# Patient Record
Sex: MALE | Race: BLACK OR AFRICAN AMERICAN
[De-identification: names, ages, dates, MRNs, and addresses within clinical notes are randomized per-mention and may not be internally consistent; named-entity substitution may affect disease eponyms.]

---

## 2017-03-27 RX ORDER — ALLOPURINOL 300 MG/1
TABLET ORAL
Qty: 30 TABLET | Refills: 3 | Status: SHIPPED | OUTPATIENT
Start: 2017-03-27

## 2017-10-06 NOTE — EMERGENCY ROOM REPORT
History of Present Illness


General


Chief Complaint:  Overdose


Source:  Patient, EMS





Present Illness


HPI


The patient presents with chest pressure and dysphoria after ingesting a 

marijuana edible.  He is a dialysis patient with peritoneal dialysis.  He 

denies any fevers.  He feels fatigued.  There is no headache.  He feels that he'

s having difficulty with his memory at the moment.  Family concerned as he 

removed his own PD catheter and appeared to do so clumsily.  He is anxious but 

denies SI.





Nausea.  No pain. No dyspnea.  Some feeling like heart racing.  No numbness, 

tingling or unilateral weakness.





Still makes urine.





No abdominal pain.


Allergies:  


Coded Allergies:  


     No Known Allergies (Unverified , 10/6/17)





Patient History


Past Medical History:  see triage record


Social History:  Denies: smoking


Social History Narrative


with family


Reviewed Nursing Documentation:  PMH: Agreed, PSxH: Agreed





Nursing Documentation-PMH


Hx Hypertension:  Yes


Hx Diabetes:  Yes - peritoneal dialysis


Hx Dialysis:  Yes





Review of Systems


All Other Systems:  negative except mentioned in HPI





Physical Exam





Vital Signs








  Date Time  Temp Pulse Resp B/P (MAP) Pulse Ox O2 Delivery O2 Flow Rate FiO2


 


10/6/17 02:53 98.2 106 18 136/79 98 Room Air  








Sp02 EP Interpretation:  reviewed, normal


General Appearance:  well appearing, no apparent distress, GCS 15


Head:  normocephalic


Eyes:  bilateral eye PERRL, bilateral eye Scleral Injection


ENT:  moist mucus membranes


Neck:  supple


Respiratory:  lungs clear, normal breath sounds


Cardiovascular #1:  tachycardia


Cardiovascular #2:  2+ radial (R)


Gastrointestinal:  normal inspection, normal bowel sounds, non tender, soft, no 

mass, non-distended, no guarding, no rebound, other - PD cath


Musculoskeletal:  back normal, gait/station normal, normal range of motion


Neurologic:  alert, oriented x3, CNs III-XII nml as tested, motor strength/tone 

normal, DTRs symmetric, sensory intact, speech normal


Psychiatric:  anxious


Skin:  normal inspection, warm/dry





Medical Decision Making


Diagnostic Impression:  


 Primary Impression:  


 Adverse reaction to cannabis


 Qualified Codes:  T40.7X5A - Adverse effect of cannabis (derivatives), initial 

encounter


 Additional Impression:  


 Renal failure treated with peritoneal dialysis


ER Course


Patient with dysphoria post ingestion of cannabis.  Complicated as he is PD - 

ESRD.  DDx: adverse reaction to THC, electrolyte abnormality, AMI amongst 

others.  Evaluation with EKG, labs.  As PD patient, not aggressive hydration.  

Monitor cor.  Ativan ordered.





Ativan held as patient sleeping.





Labs remarkable for CRF.  Urine not obtained.





Patient improved with observation.





Patient stable for outpatient observation and treatment.





Laboratory Tests








Test


  10/6/17


03:20


 


White Blood Count


  6.2 K/UL


(4.8-10.8)


 


Red Blood Count


  3.48 M/UL


(4.70-6.10)  L


 


Hemoglobin


  11.3 G/DL


(14.2-18.0)  L


 


Hematocrit


  35.7 %


(42.0-52.0)  L


 


Mean Corpuscular Volume


  103 FL (80-99)


H


 


Mean Corpuscular Hemoglobin


  32.5 PG


(27.0-31.0)  H


 


Mean Corpuscular Hemoglobin


Concent 31.6 G/DL


(32.0-36.0)  L


 


Red Cell Distribution Width


  14.6 %


(11.6-14.8)


 


Platelet Count


  283 K/UL


(150-450)


 


Mean Platelet Volume


  5.6 FL


(6.5-10.1)  L


 


Neutrophils (%) (Auto)


  68.5 %


(45.0-75.0)


 


Lymphocytes (%) (Auto)


  17.4 %


(20.0-45.0)  L


 


Monocytes (%) (Auto)


  8.4 %


(1.0-10.0)


 


Eosinophils (%) (Auto)


  4.1 %


(0.0-3.0)  H


 


Basophils (%) (Auto)


  1.6 %


(0.0-2.0)


 


Sodium Level


  139 mEQ/L


(135-145)


 


Potassium Level


  4.8 mEQ/L


(3.4-4.9)


 


Chloride Level


  97 mEQ/L


()  L


 


Carbon Dioxide Level


  25 mEQ/L


(20-30)


 


Anion Gap 17 (5-15)  H


 


Blood Urea Nitrogen


  67 mg/dL


(7-23)  H


 


Creatinine


  16.0 mg/dL


(0.7-1.2)  H


 


Estimate Glomerular


Filtration Rate 3.2 mL/min


(>60)


 


Glucose Level


  160 mg/dL


()  H


 


Calcium Level


  8.2 mg/dL


(8.6-10.2)  L


 


Total Bilirubin


  0.4 mg/dL


(0.0-1.2)


 


Aspartate Amino Transferase


(AST) 15 U/L (5-40)  


 


 


Alanine Aminotransferase (ALT) 12 U/L (3-41)  


 


Alkaline Phosphatase


  108 U/L


()


 


Total Creatine Kinase


  233 U/L


()  H


 


Troponin I


  < 0.30 ng/mL


(<=0.30)


 


Total Protein


  7.0 g/dL


(6.6-8.7)


 


Albumin


  3.8 g/dL


(3.5-5.2)


 


Globulin 3.2 g/dL  


 


Albumin/Globulin Ratio 1.1 (1.0-2.7)  


 


Salicylates Level


  < 1 mg/dL


(10-30)  L


 


Acetaminophen Level


  < 10 ug/mL


(10-30)  L


 


Serum Alcohol < 10 mg/dL  








EKG Diagnostic Results


Rate:  tachycardiac


Rhythm:  NSR


ST Segments:  no acute changes





Rhythm Strip Diag. Results


EP Interpretation:  yes


Rhythm:  no PVC's, no ectopy, other - ST





Chest X-Ray Diagnostic Results


Chest X-Ray Diagnostic Results :  


   Chest X-Ray Ordered:  Yes


   # of Views/Limited/Complete:  1 View


   Indication:  Other


   Interpretation:  no effusion, no pneumothorax, other - Pulmonary 

hypertension and cardiomegaly


   Impression:  Other


   Electronically Signed by:  Electronically signed by Luis Valencia MD





Last Vital Signs








  Date Time  Temp Pulse Resp B/P (MAP) Pulse Ox O2 Delivery O2 Flow Rate FiO2


 


10/6/17 07:41  98 16 138/80 96 Room Air  


 


10/6/17 03:30 97.0       








Status:  improved


Disposition:  HOME, SELF-CARE


Condition:  Improved











Luis Valencia M.D. Oct 6, 2017 03:15

## 2017-10-06 NOTE — DIAGNOSTIC IMAGING REPORT
Indication: Dyspnea



Comparison:  None



A single view chest radiograph was obtained.



Findings:



The heart is enlarged. Lungs are clear with normal vascularity. Bones are remarkable.



Impression:



No acute findings

## 2017-10-12 NOTE — CARDIOLOGY REPORT
--------------- APPROVED REPORT --------------





EKG Measurement

Heart [id number]

VA [id number]

[id number]

[id number]

[id number]





Sinus tachycardia

Septal infarct, age undetermined

Abnormal ECG

## 2018-10-10 ENCOUNTER — HOSPITAL ENCOUNTER (OUTPATIENT)
Dept: HOSPITAL 87 - RAD | Age: 53
Discharge: HOME | End: 2018-10-10
Attending: INTERNAL MEDICINE
Payer: MEDICARE

## 2018-10-10 DIAGNOSIS — I51.7: Primary | ICD-10-CM

## 2018-10-10 PROCEDURE — 71046 X-RAY EXAM CHEST 2 VIEWS: CPT

## 2019-07-19 ENCOUNTER — HOSPITAL ENCOUNTER (OUTPATIENT)
Dept: HOSPITAL 87 - RAD | Age: 54
Discharge: HOME | End: 2019-07-19
Attending: INTERNAL MEDICINE
Payer: MEDICARE
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
## 2019-07-19 DIAGNOSIS — I51.7: ICD-10-CM
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
## 2019-07-19 DIAGNOSIS — S22.32XA: Primary | ICD-10-CM
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
## 2019-07-19 PROCEDURE — 73060 X-RAY EXAM OF HUMERUS: CPT

## 2019-07-19 PROCEDURE — 71101 X-RAY EXAM UNILAT RIBS/CHEST: CPT

## 2019-07-19 PROCEDURE — 73090 X-RAY EXAM OF FOREARM: CPT

## 2020-08-06 ENCOUNTER — HOSPITAL ENCOUNTER (INPATIENT)
Dept: HOSPITAL 87 - ER | Age: 55
LOS: 4 days | Discharge: HOME | DRG: 371 | End: 2020-08-10
Attending: FAMILY MEDICINE | Admitting: FAMILY MEDICINE
Payer: MEDICAID

## 2020-08-06 ENCOUNTER — HOSPITAL ENCOUNTER (EMERGENCY)
Dept: HOSPITAL 72 - EMR | Age: 55
Discharge: LEFT BEFORE DISCHARGE | End: 2020-08-06
Payer: MEDICARE

## 2020-08-06 VITALS — WEIGHT: 220 LBS | HEIGHT: 70 IN | BODY MASS INDEX: 31.5 KG/M2

## 2020-08-06 VITALS — SYSTOLIC BLOOD PRESSURE: 140 MMHG | DIASTOLIC BLOOD PRESSURE: 68 MMHG

## 2020-08-06 VITALS — DIASTOLIC BLOOD PRESSURE: 75 MMHG | SYSTOLIC BLOOD PRESSURE: 138 MMHG

## 2020-08-06 VITALS — DIASTOLIC BLOOD PRESSURE: 72 MMHG | SYSTOLIC BLOOD PRESSURE: 139 MMHG

## 2020-08-06 VITALS — HEIGHT: 69 IN | WEIGHT: 212.44 LBS | BODY MASS INDEX: 31.46 KG/M2
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
## 2020-08-06 DIAGNOSIS — D63.1: ICD-10-CM

## 2020-08-06 DIAGNOSIS — F19.10: ICD-10-CM

## 2020-08-06 DIAGNOSIS — I31.3: ICD-10-CM

## 2020-08-06 DIAGNOSIS — Z99.2: ICD-10-CM

## 2020-08-06 DIAGNOSIS — M10.9: ICD-10-CM

## 2020-08-06 DIAGNOSIS — E86.0: ICD-10-CM

## 2020-08-06 DIAGNOSIS — Z91.19: ICD-10-CM

## 2020-08-06 DIAGNOSIS — K65.9: Primary | ICD-10-CM

## 2020-08-06 DIAGNOSIS — Z82.49: ICD-10-CM

## 2020-08-06 DIAGNOSIS — Z79.899: ICD-10-CM

## 2020-08-06 DIAGNOSIS — E87.6: ICD-10-CM

## 2020-08-06 DIAGNOSIS — N18.6: ICD-10-CM

## 2020-08-06 DIAGNOSIS — I12.0: ICD-10-CM

## 2020-08-06 DIAGNOSIS — G47.00: ICD-10-CM

## 2020-08-06 DIAGNOSIS — R07.89: ICD-10-CM

## 2020-08-06 DIAGNOSIS — F41.9: ICD-10-CM

## 2020-08-06 DIAGNOSIS — T40.7X1A: Primary | ICD-10-CM

## 2020-08-06 DIAGNOSIS — E46: ICD-10-CM

## 2020-08-06 DIAGNOSIS — E87.1: ICD-10-CM

## 2020-08-06 DIAGNOSIS — N19: ICD-10-CM

## 2020-08-06 LAB
ADD MANUAL DIFF: NO
ALBUMIN SERPL-MCNC: 3.3 G/DL (ref 3.4–5)
ALBUMIN/GLOB SERPL: 0.6 {RATIO} (ref 1–2.7)
ALP SERPL-CCNC: 94 U/L (ref 46–116)
ALT SERPL-CCNC: 20 U/L (ref 12–78)
AMMONIA PLAS-SCNC: 10 UMOL/L (ref 11–32)
ANION GAP SERPL CALC-SCNC: 12 MMOL/L (ref 5–15)
AST SERPL-CCNC: 18 U/L (ref 15–37)
BASOPHILS NFR BLD AUTO: 1 % (ref 0–2)
BASOPHILS NFR BLD AUTO: 1.6 % (ref 0–2)
BILIRUB SERPL-MCNC: 0.5 MG/DL (ref 0.2–1)
BUN SERPL-MCNC: 56 MG/DL (ref 7–18)
CALCIUM SERPL-MCNC: 9.9 MG/DL (ref 8.5–10.1)
CHLORIDE SERPL-SCNC: 94 MMOL/L (ref 98–107)
CHLORIDE SERPL-SCNC: 96 MEQ/L (ref 98–107)
CO2 SERPL-SCNC: 28 MMOL/L (ref 21–32)
CREAT SERPL-MCNC: 16.9 MG/DL (ref 0.55–1.3)
EOSINOPHIL NFR BLD AUTO: 1.5 % (ref 0–5)
EOSINOPHIL NFR BLD AUTO: 2.8 % (ref 0–3)
ERYTHROCYTE [DISTWIDTH] IN BLOOD BY AUTOMATED COUNT: 15 % (ref 11.6–14.8)
ERYTHROCYTE [DISTWIDTH] IN BLOOD BY AUTOMATED COUNT: 17.1 % (ref 11.6–14.6)
GLOBULIN SER-MCNC: 5.3 G/DL
HCT VFR BLD AUTO: 28 % (ref 42–52)
HCT VFR BLD CALC: 33.7 % (ref 42–52)
HGB BLD-MCNC: 10.9 G/DL (ref 14.2–18)
HGB BLD-MCNC: 9.5 G/DL (ref 14–18)
INR PPP: 1
LYMPHOCYTES NFR BLD AUTO: 21.4 % (ref 20–45)
LYMPHOCYTES NFR BLD AUTO: 21.4 % (ref 20–50)
MCH RBC QN AUTO: 31.7 PG (ref 28–32)
MCV RBC AUTO: 93.6 FL (ref 80–94)
MCV RBC AUTO: 96 FL (ref 80–99)
MONOCYTES NFR BLD AUTO: 8.5 % (ref 2–8)
MONOCYTES NFR BLD AUTO: 9 % (ref 1–10)
NEUTROPHILS NFR BLD AUTO: 65.2 % (ref 45–75)
NEUTROPHILS NFR BLD AUTO: 67.6 % (ref 40–76)
PLATELET # BLD AUTO: 305 X1000/UL (ref 130–400)
PLATELET # BLD: 350 K/UL (ref 150–450)
PMV BLD AUTO: 7.9 FL (ref 7.4–10.4)
POTASSIUM SERPL-SCNC: 3.4 MMOL/L (ref 3.5–5.1)
PROTHROMBIN TIME: 10.7 SEC (ref 9.6–11)
RBC # BLD AUTO: 2.99 MILL/UL (ref 4.7–6.1)
RBC # BLD AUTO: 3.51 M/UL (ref 4.7–6.1)
SODIUM SERPL-SCNC: 134 MMOL/L (ref 136–145)
WBC # BLD AUTO: 6.5 K/UL (ref 4.8–10.8)

## 2020-08-06 PROCEDURE — 83550 IRON BINDING TEST: CPT

## 2020-08-06 PROCEDURE — 36415 COLL VENOUS BLD VENIPUNCTURE: CPT

## 2020-08-06 PROCEDURE — 80061 LIPID PANEL: CPT

## 2020-08-06 PROCEDURE — 93970 EXTREMITY STUDY: CPT

## 2020-08-06 PROCEDURE — 85025 COMPLETE CBC W/AUTO DIFF WBC: CPT

## 2020-08-06 PROCEDURE — 93306 TTE W/DOPPLER COMPLETE: CPT

## 2020-08-06 PROCEDURE — 82550 ASSAY OF CK (CPK): CPT

## 2020-08-06 PROCEDURE — 84100 ASSAY OF PHOSPHORUS: CPT

## 2020-08-06 PROCEDURE — 80053 COMPREHEN METABOLIC PANEL: CPT

## 2020-08-06 PROCEDURE — 84484 ASSAY OF TROPONIN QUANT: CPT

## 2020-08-06 PROCEDURE — 71045 X-RAY EXAM CHEST 1 VIEW: CPT

## 2020-08-06 PROCEDURE — 70450 CT HEAD/BRAIN W/O DYE: CPT

## 2020-08-06 PROCEDURE — 82140 ASSAY OF AMMONIA: CPT

## 2020-08-06 PROCEDURE — 93005 ELECTROCARDIOGRAM TRACING: CPT

## 2020-08-06 PROCEDURE — 83605 ASSAY OF LACTIC ACID: CPT

## 2020-08-06 PROCEDURE — 83735 ASSAY OF MAGNESIUM: CPT

## 2020-08-06 PROCEDURE — 83540 ASSAY OF IRON: CPT

## 2020-08-06 PROCEDURE — 83880 ASSAY OF NATRIURETIC PEPTIDE: CPT

## 2020-08-06 PROCEDURE — 80305 DRUG TEST PRSMV DIR OPT OBS: CPT

## 2020-08-06 PROCEDURE — 80048 BASIC METABOLIC PNL TOTAL CA: CPT

## 2020-08-06 PROCEDURE — 82553 CREATINE MB FRACTION: CPT

## 2020-08-06 PROCEDURE — 99284 EMERGENCY DEPT VISIT MOD MDM: CPT

## 2020-08-06 PROCEDURE — 96365 THER/PROPH/DIAG IV INF INIT: CPT

## 2020-08-06 PROCEDURE — 99285 EMERGENCY DEPT VISIT HI MDM: CPT

## 2020-08-06 RX ADMIN — ENOXAPARIN SODIUM SCH MG: 30 INJECTION SUBCUTANEOUS at 23:13

## 2020-08-06 NOTE — NUR
ED Nurse Note:



Patient BIBA from home after wife called 911 d/t patient acting abnormal. 
Patient states he normally eats one 5 mg THC gummy to assist with sleep. 
Patient ate 3 gummies at one time last night, total of 15 mg THC. Patient 
states he feels that he took too much, feels "spaced out". Patient AxO x 4, 
able to communicate clearly and express needs. Pupils PERLLA. Breathing even 
and unlabored. Patient on the cardiac monitor, VSS. Will continue to monitor.

## 2020-08-06 NOTE — NUR
ED Nurse Note:



Patient resting in bed, no s/s of acute distress. Breathing even and unlabored, 
patient states he is feeling better than when he came in.

## 2020-08-06 NOTE — NUR
ED Nurse Note:



20 g IV started in right forearm by EMS prior to arrival. Blood drawn and sent 
to lab.

## 2020-08-06 NOTE — DIAGNOSTIC IMAGING REPORT
Indication: Reason For Exam: AMS

 

Technique: Continuous helical CT scanning of the head was performed without

intravenous contrast material. Axial and coronal 5 mm sections were generated.

 

Dose:

Total Dose Length Product - DLP 2404 mGycm.

Volume CT Dose Index - CTDIvol(s) 160.20 mGy.

Automated exposure control was utilized for dose reduction.

 

Comparison:None.

 

Findings: The ventricular system is normal in size and configuration. There is no

shift of midline structures. No abnormal extra-axial fluid collections are noted.

There is no evidence of intracerebral bleeding. No other abnormal high or low density

areas are noted within the brain.

 

Impression:

 

Normal CT scan of the head without contrast material.

 

 

 

The CT scanner at [facility] is accredited by the [employer] College of

Radiology and the scans are performed using protocols designed to limit radiation

exposure to as low as reasonably achievable to attain images of sufficient resolution

adequate for diagnostic evaluation.

## 2020-08-06 NOTE — EMERGENCY ROOM REPORT
History of Present Illness


General


Chief Complaint:  Overdose


Source:  Patient, EMS





Present Illness


HPI


Patient is a 55-year-old male brought in by ambulance after increased altered 

mental status.  Prior history of end-stage renal disease on peritoneal 

dialysis.  Was noted to be increasing confusion.  Reportedly had been ingesting 

marijuana edibles.  Patient had been noted to have a blood sugar of 200 with 

EMS.  Reportedly had been moving all extremities as well as having increased 

bizarre behavior.  History is limited by patient's mental status


Allergies:  


Coded Allergies:  


     No Known Allergies (Unverified , 10/6/17)





COVID-19 Screening


Contact w/high risk pt:  No


Experienced COVID-19 symptoms?:  No


COVID-19 Testing performed PTA:  No





Patient History


Past Medical History:  see triage record


Reviewed Nursing Documentation:  PMH: Agreed; PSxH: Agreed





Review of Systems


All Other Systems:  limited - Limited by poor historian





Physical Exam





Vital Signs








  Date Time  Temp Pulse Resp B/P (MAP) Pulse Ox O2 Delivery O2 Flow Rate FiO2


 


8/6/20 07:33 97.5 90 16 113/67 (82) 98 Room Air  








Sp02 EP Interpretation:  reviewed, normal


General Appearance:  normal inspection, well appearing, no apparent distress, 

alert, Chronically Ill


Head:  atraumatic


ENT:  normal ENT inspection, hearing grossly normal, normal voice


Neck:  normal inspection, full range of motion, supple, no bony tend


Respiratory:  normal inspection, lungs clear, normal breath sounds, no 

respiratory distress, no retraction, no wheezing


Cardiovascular #1:  regular rate, rhythm, no edema


Gastrointestinal:  normal inspection, normal bowel sounds, non tender, soft, no 

guarding, no hernia, other - Peritoneal dialysis catheter in place


Genitourinary:  no CVA tenderness


Musculoskeletal:  normal inspection, back normal, normal range of motion


Neurologic:  alert, motor strength/tone normal, oriented x3, responsive, speech 

normal, other - Confused


Psychiatric:  normal inspection, judgement/insight normal, mood/affect normal


Skin:  no rash





Medical Decision Making


Diagnostic Impression:  


 Primary Impression:  


 Drug overdose


 Additional Impressions:  


 Renal failure treated with peritoneal dialysis


 Adverse reaction to cannabis


ER Course


Patient presented for altered mental status.  Differential diagnosis include 

was not limited to uremia, hypoglycemia, drug overdose, electrolyte abnormality 

among others.  Because of complexity of patient's case laboratory tests and 

imaging studies were ordered.  Patient was noted to have some prior history of 

peritoneal dialysis and has a benign abdominal exam.  Patient's blood sugar was 

checked by EMS and was 200.  CT imaging was ordered due to patient's altered 

mental status.Patient was observed in the emergency department and had 

improvement in his mental status over time.  CT imaging showed no evidence of 

acute pathology.  Patient had been noted to be awake alert and oriented x3.  

Patient was offered admission due to abnormal  on EKG. Patient initially 

refused.





Labs








Test


  8/6/20


07:50


 


White Blood Count


  6.5 K/UL


(4.8-10.8)


 


Red Blood Count


  3.51 M/UL


(4.70-6.10)


 


Hemoglobin


  10.9 G/DL


(14.2-18.0)


 


Hematocrit


  33.7 %


(42.0-52.0)


 


Mean Corpuscular Volume 96 FL (80-99) 


 


Mean Corpuscular Hemoglobin


  30.9 PG


(27.0-31.0)


 


Mean Corpuscular Hemoglobin


Concent 32.2 G/DL


(32.0-36.0)


 


Red Cell Distribution Width


  15.0 %


(11.6-14.8)


 


Platelet Count


  350 K/UL


(150-450)


 


Mean Platelet Volume


  5.9 FL


(6.5-10.1)


 


Neutrophils (%) (Auto)


  65.2 %


(45.0-75.0)


 


Lymphocytes (%) (Auto)


  21.4 %


(20.0-45.0)


 


Monocytes (%) (Auto)


  9.0 %


(1.0-10.0)


 


Eosinophils (%) (Auto)


  2.8 %


(0.0-3.0)


 


Basophils (%) (Auto)


  1.6 %


(0.0-2.0)


 


Sodium Level


  134 MMOL/L


(136-145)


 


Potassium Level


  3.4 MMOL/L


(3.5-5.1)


 


Chloride Level


  94 MMOL/L


()


 


Carbon Dioxide Level


  28 MMOL/L


(21-32)


 


Anion Gap


  12 mmol/L


(5-15)


 


Blood Urea Nitrogen


  56 mg/dL


(7-18)


 


Creatinine


  16.9 MG/DL


(0.55-1.30)


 


Estimat Glomerular Filtration


Rate 3.6 mL/min


(>60)


 


Glucose Level


  171 MG/DL


()


 


Lactic Acid Level


  1.40 mmol/L


(0.4-2.0)


 


Calcium Level


  9.9 MG/DL


(8.5-10.1)


 


Total Bilirubin


  0.5 MG/DL


(0.2-1.0)


 


Aspartate Amino Transf


(AST/SGOT) 18 U/L (15-37) 


 


 


Alanine Aminotransferase


(ALT/SGPT) 20 U/L (12-78) 


 


 


Alkaline Phosphatase


  94 U/L


()


 


Ammonia


  10 umol/L


(11-32)


 


Troponin I


  0.012 ng/mL


(0.000-0.056)


 


Total Protein


  8.6 G/DL


(6.4-8.2)


 


Albumin


  3.3 G/DL


(3.4-5.0)


 


Globulin 5.3 g/dL 


 


Albumin/Globulin Ratio 0.6 (1.0-2.7) 


 


Salicylates Level


  0.8 ug/mL


(2.8-20)


 


Acetaminophen Level


  < 2 MCG/ML


(10-30)


 


Serum Alcohol < 3 mg/dL 








EKG Diagnostic Results


Rate:  normal


Rhythm:  NSR


ST Segments:  other - T wave inversion.





Last Vital Signs








  Date Time  Temp Pulse Resp B/P (MAP) Pulse Ox O2 Delivery O2 Flow Rate FiO2


 


8/6/20 07:33 97.5 90 16 113/67 (82) 98 Room Air  








Status:  unchanged


Disposition:  SHORT-TERM HOSP


Condition:  Stable











Shan Courtney MD Aug 6, 2020 07:45

## 2020-08-06 NOTE — NUR
ED Nurse Note:



Patient attempted to provide urine x 2, unable to urinate. Urinal left at 
bedside with patient.

## 2020-08-07 VITALS — DIASTOLIC BLOOD PRESSURE: 93 MMHG | SYSTOLIC BLOOD PRESSURE: 153 MMHG

## 2020-08-07 VITALS — SYSTOLIC BLOOD PRESSURE: 160 MMHG | DIASTOLIC BLOOD PRESSURE: 100 MMHG

## 2020-08-07 VITALS — DIASTOLIC BLOOD PRESSURE: 90 MMHG | SYSTOLIC BLOOD PRESSURE: 154 MMHG

## 2020-08-07 LAB
BASOPHILS NFR BLD AUTO: 1.5 % (ref 0–2)
CHLORIDE SERPL-SCNC: 98 MEQ/L (ref 98–107)
CK MB SERPL-CCNC: 1.2 NG/ML (ref 0.5–3.6)
CK MB SERPL-CCNC: 1.7 NG/ML (ref 0.5–3.6)
CK SERPL-CCNC: 160 IU/L (ref 39–308)
CK SERPL-CCNC: 161 IU/L (ref 39–308)
EOSINOPHIL NFR BLD AUTO: 2.1 % (ref 0–5)
ERYTHROCYTE [DISTWIDTH] IN BLOOD BY AUTOMATED COUNT: 17.3 % (ref 11.6–14.6)
HCT VFR BLD AUTO: 25.4 % (ref 42–52)
HDLC SERPL-MCNC: 26 MG/DL (ref 40–59)
HGB BLD-MCNC: 8.6 G/DL (ref 14–18)
LDLC SERPL DIRECT ASSAY-MCNC: 71 MG/DL (ref 5–100)
LYMPHOCYTES NFR BLD AUTO: 21.4 % (ref 20–50)
MCH RBC QN AUTO: 31.4 PG (ref 28–32)
MCV RBC AUTO: 92.9 FL (ref 80–94)
MONOCYTES NFR BLD AUTO: 7.5 % (ref 2–8)
NEUTROPHILS NFR BLD AUTO: 67.5 % (ref 40–76)
PHOSPHATE SERPL-MCNC: 4.4 MG/DL (ref 2.5–4.9)
PLATELET # BLD AUTO: 267 X1000/UL (ref 130–400)
PMV BLD AUTO: 7.5 FL (ref 7.4–10.4)
RBC # BLD AUTO: 2.74 MILL/UL (ref 4.7–6.1)
TIBC SERPL-MCNC: 156 UG/DL (ref 250–450)

## 2020-08-07 RX ADMIN — ENOXAPARIN SODIUM SCH MG: 30 INJECTION SUBCUTANEOUS at 21:49

## 2020-08-07 RX ADMIN — CINACALCET HYDROCHLORIDE SCH MG: 30 TABLET, COATED ORAL at 09:42

## 2020-08-07 RX ADMIN — NIFEDIPINE SCH MG: 30 TABLET, FILM COATED, EXTENDED RELEASE ORAL at 09:46

## 2020-08-07 RX ADMIN — SERTRALINE HYDROCHLORIDE SCH MG: 50 TABLET, FILM COATED ORAL at 09:42

## 2020-08-07 RX ADMIN — OMEPRAZOLE SCH MG: 20 CAPSULE, DELAYED RELEASE ORAL at 09:41

## 2020-08-07 RX ADMIN — ATORVASTATIN CALCIUM SCH MG: 10 TABLET, FILM COATED ORAL at 21:50

## 2020-08-07 RX ADMIN — CARVEDILOL SCH MG: 12.5 TABLET, FILM COATED ORAL at 21:50

## 2020-08-07 RX ADMIN — ALLOPURINOL SCH MG: 100 TABLET ORAL at 09:41

## 2020-08-07 RX ADMIN — CARVEDILOL SCH MG: 12.5 TABLET, FILM COATED ORAL at 09:47

## 2020-08-08 VITALS — SYSTOLIC BLOOD PRESSURE: 150 MMHG | DIASTOLIC BLOOD PRESSURE: 92 MMHG

## 2020-08-08 VITALS — DIASTOLIC BLOOD PRESSURE: 81 MMHG | SYSTOLIC BLOOD PRESSURE: 153 MMHG

## 2020-08-08 VITALS — SYSTOLIC BLOOD PRESSURE: 136 MMHG | DIASTOLIC BLOOD PRESSURE: 78 MMHG

## 2020-08-08 VITALS — SYSTOLIC BLOOD PRESSURE: 126 MMHG | DIASTOLIC BLOOD PRESSURE: 73 MMHG

## 2020-08-08 VITALS — SYSTOLIC BLOOD PRESSURE: 123 MMHG | DIASTOLIC BLOOD PRESSURE: 81 MMHG

## 2020-08-08 VITALS — SYSTOLIC BLOOD PRESSURE: 149 MMHG | DIASTOLIC BLOOD PRESSURE: 85 MMHG

## 2020-08-08 LAB
AMPHETAMINES UR QL SCN: NEGATIVE
BARBITURATES UR QL SCN: NEGATIVE
BASOPHILS NFR BLD AUTO: 0.9 % (ref 0–2)
BENZODIAZ UR QL SCN: NEGATIVE
BZE UR QL SCN: NEGATIVE
CANNABINOIDS UR QL SCN: NEGATIVE
CHLORIDE SERPL-SCNC: 95 MEQ/L (ref 98–107)
EOSINOPHIL NFR BLD AUTO: 3.2 % (ref 0–5)
ERYTHROCYTE [DISTWIDTH] IN BLOOD BY AUTOMATED COUNT: 17.5 % (ref 11.6–14.6)
HCT VFR BLD AUTO: 25.5 % (ref 42–52)
HGB BLD-MCNC: 8.6 G/DL (ref 14–18)
LYMPHOCYTES NFR BLD AUTO: 20.5 % (ref 20–50)
MCH RBC QN AUTO: 31.6 PG (ref 28–32)
MCV RBC AUTO: 93.6 FL (ref 80–94)
METHADONE UR QL SCN: NEGATIVE
MONOCYTES NFR BLD AUTO: 9.9 % (ref 2–8)
NEUTROPHILS NFR BLD AUTO: 65.5 % (ref 40–76)
OPIATES UR QL SCN: NEGATIVE
PCP UR QL SCN: NEGATIVE
PLATELET # BLD AUTO: 258 X1000/UL (ref 130–400)
PMV BLD AUTO: 7.9 FL (ref 7.4–10.4)
RBC # BLD AUTO: 2.73 MILL/UL (ref 4.7–6.1)

## 2020-08-08 RX ADMIN — OMEPRAZOLE SCH MG: 20 CAPSULE, DELAYED RELEASE ORAL at 06:35

## 2020-08-08 RX ADMIN — ENOXAPARIN SODIUM SCH MG: 30 INJECTION SUBCUTANEOUS at 21:05

## 2020-08-08 RX ADMIN — Medication PRN MG: at 00:42

## 2020-08-08 RX ADMIN — CARVEDILOL SCH MG: 12.5 TABLET, FILM COATED ORAL at 08:49

## 2020-08-08 RX ADMIN — CINACALCET HYDROCHLORIDE SCH MG: 30 TABLET, COATED ORAL at 08:50

## 2020-08-08 RX ADMIN — ALLOPURINOL SCH MG: 100 TABLET ORAL at 08:50

## 2020-08-08 RX ADMIN — ATORVASTATIN CALCIUM SCH MG: 10 TABLET, FILM COATED ORAL at 21:05

## 2020-08-08 RX ADMIN — SERTRALINE HYDROCHLORIDE SCH MG: 50 TABLET, FILM COATED ORAL at 08:50

## 2020-08-08 RX ADMIN — NIFEDIPINE SCH MG: 30 TABLET, FILM COATED, EXTENDED RELEASE ORAL at 08:50

## 2020-08-08 RX ADMIN — Medication PRN MG: at 22:45

## 2020-08-08 RX ADMIN — Medication PRN MG: at 12:42

## 2020-08-08 RX ADMIN — CARVEDILOL SCH MG: 12.5 TABLET, FILM COATED ORAL at 21:05

## 2020-08-09 VITALS — DIASTOLIC BLOOD PRESSURE: 90 MMHG | SYSTOLIC BLOOD PRESSURE: 140 MMHG

## 2020-08-09 VITALS — SYSTOLIC BLOOD PRESSURE: 144 MMHG | DIASTOLIC BLOOD PRESSURE: 80 MMHG

## 2020-08-09 VITALS — DIASTOLIC BLOOD PRESSURE: 92 MMHG | SYSTOLIC BLOOD PRESSURE: 142 MMHG

## 2020-08-09 VITALS — SYSTOLIC BLOOD PRESSURE: 139 MMHG | DIASTOLIC BLOOD PRESSURE: 79 MMHG

## 2020-08-09 VITALS — SYSTOLIC BLOOD PRESSURE: 138 MMHG | DIASTOLIC BLOOD PRESSURE: 79 MMHG

## 2020-08-09 VITALS — DIASTOLIC BLOOD PRESSURE: 82 MMHG | SYSTOLIC BLOOD PRESSURE: 159 MMHG

## 2020-08-09 LAB
BASOPHILS NFR BLD AUTO: 0.8 % (ref 0–2)
CHLORIDE SERPL-SCNC: 95 MEQ/L (ref 98–107)
EOSINOPHIL NFR BLD AUTO: 2.2 % (ref 0–5)
ERYTHROCYTE [DISTWIDTH] IN BLOOD BY AUTOMATED COUNT: 16.9 % (ref 11.6–14.6)
HCT VFR BLD AUTO: 25.6 % (ref 42–52)
HGB BLD-MCNC: 8.6 G/DL (ref 14–18)
LYMPHOCYTES NFR BLD AUTO: 18.2 % (ref 20–50)
MCH RBC QN AUTO: 31.3 PG (ref 28–32)
MCV RBC AUTO: 93.7 FL (ref 80–94)
MONOCYTES NFR BLD AUTO: 10.6 % (ref 2–8)
NEUTROPHILS NFR BLD AUTO: 68.2 % (ref 40–76)
PLATELET # BLD AUTO: 277 X1000/UL (ref 130–400)
PMV BLD AUTO: 7.7 FL (ref 7.4–10.4)
RBC # BLD AUTO: 2.73 MILL/UL (ref 4.7–6.1)

## 2020-08-09 PROCEDURE — 3E1M39Z IRRIGATION OF PERITONEAL CAVITY USING DIALYSATE, PERCUTANEOUS APPROACH: ICD-10-PCS | Performed by: FAMILY MEDICINE

## 2020-08-09 RX ADMIN — CARVEDILOL SCH MG: 12.5 TABLET, FILM COATED ORAL at 09:00

## 2020-08-09 RX ADMIN — ALLOPURINOL SCH MG: 100 TABLET ORAL at 09:00

## 2020-08-09 RX ADMIN — ALLOPURINOL SCH MG: 100 TABLET ORAL at 11:42

## 2020-08-09 RX ADMIN — SERTRALINE HYDROCHLORIDE SCH MG: 50 TABLET, FILM COATED ORAL at 09:00

## 2020-08-09 RX ADMIN — CINACALCET HYDROCHLORIDE SCH MG: 30 TABLET, COATED ORAL at 11:42

## 2020-08-09 RX ADMIN — Medication PRN MG: at 20:59

## 2020-08-09 RX ADMIN — ENOXAPARIN SODIUM SCH MG: 30 INJECTION SUBCUTANEOUS at 21:04

## 2020-08-09 RX ADMIN — NIFEDIPINE SCH MG: 30 TABLET, FILM COATED, EXTENDED RELEASE ORAL at 09:00

## 2020-08-09 RX ADMIN — SERTRALINE HYDROCHLORIDE SCH MG: 50 TABLET, FILM COATED ORAL at 11:44

## 2020-08-09 RX ADMIN — CARVEDILOL SCH MG: 12.5 TABLET, FILM COATED ORAL at 11:42

## 2020-08-09 RX ADMIN — CINACALCET HYDROCHLORIDE SCH MG: 30 TABLET, COATED ORAL at 09:00

## 2020-08-09 RX ADMIN — CARVEDILOL SCH MG: 12.5 TABLET, FILM COATED ORAL at 20:59

## 2020-08-09 RX ADMIN — ATORVASTATIN CALCIUM SCH MG: 10 TABLET, FILM COATED ORAL at 20:59

## 2020-08-09 RX ADMIN — Medication PRN MG: at 15:55

## 2020-08-09 RX ADMIN — FAMOTIDINE SCH MG: 20 TABLET ORAL at 09:00

## 2020-08-09 RX ADMIN — NIFEDIPINE SCH MG: 30 TABLET, FILM COATED, EXTENDED RELEASE ORAL at 11:44

## 2020-08-09 RX ADMIN — FAMOTIDINE SCH MG: 20 TABLET ORAL at 11:43

## 2020-08-10 VITALS — DIASTOLIC BLOOD PRESSURE: 81 MMHG | SYSTOLIC BLOOD PRESSURE: 139 MMHG

## 2020-08-10 VITALS — SYSTOLIC BLOOD PRESSURE: 133 MMHG | DIASTOLIC BLOOD PRESSURE: 77 MMHG

## 2020-08-10 VITALS — DIASTOLIC BLOOD PRESSURE: 70 MMHG | SYSTOLIC BLOOD PRESSURE: 135 MMHG

## 2020-08-10 VITALS — DIASTOLIC BLOOD PRESSURE: 80 MMHG | SYSTOLIC BLOOD PRESSURE: 146 MMHG

## 2020-08-10 LAB
BASOPHILS NFR BLD AUTO: 0.7 % (ref 0–2)
CHLORIDE SERPL-SCNC: 96 MEQ/L (ref 98–107)
EOSINOPHIL NFR BLD AUTO: 2.1 % (ref 0–5)
ERYTHROCYTE [DISTWIDTH] IN BLOOD BY AUTOMATED COUNT: 17.2 % (ref 11.6–14.6)
HCT VFR BLD AUTO: 24.9 % (ref 42–52)
HGB BLD-MCNC: 8.5 G/DL (ref 14–18)
LYMPHOCYTES NFR BLD AUTO: 19.5 % (ref 20–50)
MCH RBC QN AUTO: 32 PG (ref 28–32)
MCV RBC AUTO: 93.5 FL (ref 80–94)
MONOCYTES NFR BLD AUTO: 9.4 % (ref 2–8)
NEUTROPHILS NFR BLD AUTO: 68.3 % (ref 40–76)
PLATELET # BLD AUTO: 262 X1000/UL (ref 130–400)
PMV BLD AUTO: 7.5 FL (ref 7.4–10.4)
RBC # BLD AUTO: 2.66 MILL/UL (ref 4.7–6.1)

## 2020-08-10 RX ADMIN — SERTRALINE HYDROCHLORIDE SCH MG: 50 TABLET, FILM COATED ORAL at 09:11

## 2020-08-10 RX ADMIN — NIFEDIPINE SCH MG: 30 TABLET, FILM COATED, EXTENDED RELEASE ORAL at 09:11

## 2020-08-10 RX ADMIN — CINACALCET HYDROCHLORIDE SCH MG: 30 TABLET, COATED ORAL at 09:11

## 2020-08-10 RX ADMIN — FAMOTIDINE SCH MG: 20 TABLET ORAL at 09:11

## 2020-08-10 RX ADMIN — ALLOPURINOL SCH MG: 100 TABLET ORAL at 09:11

## 2020-08-10 RX ADMIN — CARVEDILOL SCH MG: 12.5 TABLET, FILM COATED ORAL at 09:11

## 2025-01-18 ENCOUNTER — HOSPITAL ENCOUNTER (EMERGENCY)
Dept: HOSPITAL 87 - ER | Age: 60
Discharge: LEFT BEFORE DISCHARGE | End: 2025-01-18
Payer: MEDICARE

## 2025-01-18 VITALS
HEART RATE: 74 BPM | TEMPERATURE: 99.5 F | RESPIRATION RATE: 18 BRPM | DIASTOLIC BLOOD PRESSURE: 75 MMHG | SYSTOLIC BLOOD PRESSURE: 121 MMHG | OXYGEN SATURATION: 100 %

## 2025-01-18 VITALS — OXYGEN SATURATION: 99 %

## 2025-01-18 VITALS — HEIGHT: 69 IN | BODY MASS INDEX: 29.32 KG/M2 | WEIGHT: 197.98 LBS

## 2025-01-18 DIAGNOSIS — T80.211A: Primary | ICD-10-CM
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
## 2025-01-18 DIAGNOSIS — N28.9: ICD-10-CM

## 2025-01-18 DIAGNOSIS — Z53.21: ICD-10-CM

## 2025-01-18 DIAGNOSIS — Z99.2: ICD-10-CM

## 2025-01-18 DIAGNOSIS — Z98.890: ICD-10-CM

## 2025-01-18 LAB
BASOPHILS NFR BLD AUTO: 1 % (ref 0–2)
BUN SERPL-MCNC: 16 MG/DL (ref 9–23)
CALCIUM SERPL-MCNC: 10.1 MG/DL (ref 8.7–10.4)
CHLORIDE SERPL-SCNC: 96 MEQ/L (ref 98–107)
CO2 SERPL-SCNC: 30 MEQ/L (ref 21–32)
CREAT SERPL-MCNC: 4.8 MG/DL (ref 0.6–1.3)
EOSINOPHIL NFR BLD AUTO: 3.9 % (ref 0–5)
ERYTHROCYTE [DISTWIDTH] IN BLOOD BY AUTOMATED COUNT: 13.6 % (ref 11.6–14.6)
GLUCOSE SERPL-MCNC: 93 MG/DL (ref 70–105)
HCT VFR BLD AUTO: 23.6 % (ref 42–52)
HGB BLD-MCNC: 8.3 G/DL (ref 14–18)
INR PPP: 1
LYMPHOCYTES NFR BLD AUTO: 13.5 % (ref 20–50)
MCH RBC QN AUTO: 34.3 PG (ref 28–32)
MCHC RBC AUTO-ENTMCNC: 35.2 G/DL (ref 31–37)
MCV RBC AUTO: 97.3 FL (ref 80–94)
MONOCYTES NFR BLD AUTO: 5.2 % (ref 2–8)
NEUTROPHILS NFR BLD AUTO: 76.4 % (ref 40–76)
PLATELET # BLD AUTO: 337 X1000/UL (ref 130–400)
PMV BLD AUTO: 7.2 FL (ref 7.4–10.4)
POTASSIUM SERPL-SCNC: 3.7 MEQ/L (ref 3.5–5.1)
PROTHROMBIN TIME: 11.5 SEC (ref 9.6–11)
RBC # BLD AUTO: 2.43 MILL/UL (ref 4.7–6.1)
SODIUM SERPL-SCNC: 137 MEQ/L (ref 136–145)
WBC # BLD: 8.2 X1000/UL (ref 4.5–11)

## 2025-01-18 PROCEDURE — 86850 RBC ANTIBODY SCREEN: CPT

## 2025-01-18 PROCEDURE — 80048 BASIC METABOLIC PNL TOTAL CA: CPT

## 2025-01-18 PROCEDURE — 36415 COLL VENOUS BLD VENIPUNCTURE: CPT

## 2025-01-18 PROCEDURE — 85025 COMPLETE CBC W/AUTO DIFF WBC: CPT

## 2025-01-18 PROCEDURE — 86900 BLOOD TYPING SEROLOGIC ABO: CPT
